# Patient Record
Sex: MALE
[De-identification: names, ages, dates, MRNs, and addresses within clinical notes are randomized per-mention and may not be internally consistent; named-entity substitution may affect disease eponyms.]

---

## 2021-12-24 ENCOUNTER — NURSE TRIAGE (OUTPATIENT)
Dept: OTHER | Facility: CLINIC | Age: 31
End: 2021-12-24

## 2021-12-24 NOTE — TELEPHONE ENCOUNTER
Subjective: Caller states \"I am a  and I noticed a bump develop on my penis\"     Current Symptoms: sore to corner of pubic bone and penis  Skin colored hard, round and square shape  Size of a pea, possibly smaller    Onset: 1 day ago; sudden    Associated Symptoms: NA    Pain Severity: 0/10; N/A; none    Temperature: NA by unknown method    What has been tried: nothing    LMP: NA Pregnant: NA    Recommended disposition: to see PCP in 2 weeks, Advised on signs of infection and possible signs of skin cancer    Care advice provided, patient verbalizes understanding; denies any other questions or concerns; instructed to call back for any new or worsening symptoms. Patient/caller to follow-up with PCP     This triage is a result of a call to [address] [place] [address]. Please do not respond to the triage nurse through this encounter. Any subsequent communication should be directly with the patient.       Reason for Disposition   [1] Skin growth or mole AND [2] new    Protocols used: SKIN LESION - MOLES OR GROWTHS-ADULT-